# Patient Record
Sex: MALE | Employment: PART TIME | URBAN - METROPOLITAN AREA
[De-identification: names, ages, dates, MRNs, and addresses within clinical notes are randomized per-mention and may not be internally consistent; named-entity substitution may affect disease eponyms.]

---

## 2021-05-10 ENCOUNTER — TELEPHONE (OUTPATIENT)
Dept: ORTHOPEDICS | Facility: CLINIC | Age: 61
End: 2021-05-10

## 2021-05-10 DIAGNOSIS — M25.511 RIGHT SHOULDER PAIN, UNSPECIFIED CHRONICITY: Primary | ICD-10-CM

## 2021-05-12 ENCOUNTER — OFFICE VISIT (OUTPATIENT)
Dept: ORTHOPEDICS | Facility: CLINIC | Age: 61
End: 2021-05-12
Payer: MEDICAID

## 2021-05-12 VITALS
HEART RATE: 56 BPM | SYSTOLIC BLOOD PRESSURE: 116 MMHG | DIASTOLIC BLOOD PRESSURE: 76 MMHG | WEIGHT: 151 LBS | OXYGEN SATURATION: 98 % | BODY MASS INDEX: 28.51 KG/M2 | HEIGHT: 61 IN

## 2021-05-12 DIAGNOSIS — M25.511 CHRONIC RIGHT SHOULDER PAIN: Primary | ICD-10-CM

## 2021-05-12 DIAGNOSIS — G89.29 CHRONIC RIGHT SHOULDER PAIN: Primary | ICD-10-CM

## 2021-05-12 PROCEDURE — 99999 PR PBB SHADOW E&M-EST. PATIENT-LVL IV: CPT | Mod: PBBFAC,,, | Performed by: PHYSICIAN ASSISTANT

## 2021-05-12 PROCEDURE — 99203 OFFICE O/P NEW LOW 30 MIN: CPT | Mod: 25,S$PBB,, | Performed by: PHYSICIAN ASSISTANT

## 2021-05-12 PROCEDURE — 99214 OFFICE O/P EST MOD 30 MIN: CPT | Mod: PBBFAC,PN | Performed by: PHYSICIAN ASSISTANT

## 2021-05-12 PROCEDURE — 99203 PR OFFICE/OUTPT VISIT, NEW, LEVL III, 30-44 MIN: ICD-10-PCS | Mod: 25,S$PBB,, | Performed by: PHYSICIAN ASSISTANT

## 2021-05-12 PROCEDURE — 99999 PR PBB SHADOW E&M-EST. PATIENT-LVL IV: ICD-10-PCS | Mod: PBBFAC,,, | Performed by: PHYSICIAN ASSISTANT

## 2021-05-12 RX ORDER — TRIAMCINOLONE ACETONIDE 40 MG/ML
40 INJECTION, SUSPENSION INTRA-ARTICULAR; INTRAMUSCULAR
Status: DISCONTINUED | OUTPATIENT
Start: 2021-05-12 | End: 2021-05-12 | Stop reason: HOSPADM

## 2021-05-12 RX ORDER — LISINOPRIL AND HYDROCHLOROTHIAZIDE 12.5; 2 MG/1; MG/1
2 TABLET ORAL DAILY
COMMUNITY
Start: 2021-03-29

## 2021-05-12 RX ADMIN — TRIAMCINOLONE ACETONIDE 40 MG: 40 INJECTION, SUSPENSION INTRA-ARTICULAR; INTRAMUSCULAR at 08:05

## 2021-11-18 ENCOUNTER — TELEPHONE (OUTPATIENT)
Dept: ENDOSCOPY | Facility: HOSPITAL | Age: 61
End: 2021-11-18
Payer: MEDICAID

## 2021-11-18 DIAGNOSIS — Z12.11 COLON CANCER SCREENING: Primary | ICD-10-CM

## 2021-11-24 ENCOUNTER — TELEPHONE (OUTPATIENT)
Dept: ENDOSCOPY | Facility: HOSPITAL | Age: 61
End: 2021-11-24
Payer: MEDICAID

## 2021-11-24 DIAGNOSIS — Z12.11 COLON CANCER SCREENING: Primary | ICD-10-CM

## 2021-11-24 RX ORDER — SODIUM, POTASSIUM,MAG SULFATES 17.5-3.13G
1 SOLUTION, RECONSTITUTED, ORAL ORAL DAILY
Qty: 1 KIT | Refills: 0 | Status: SHIPPED | OUTPATIENT
Start: 2021-11-24 | End: 2021-11-26

## 2021-12-09 ENCOUNTER — ANESTHESIA EVENT (OUTPATIENT)
Dept: ENDOSCOPY | Facility: HOSPITAL | Age: 61
End: 2021-12-09
Payer: MEDICAID

## 2021-12-09 NOTE — ANESTHESIA PREPROCEDURE EVALUATION
12/09/2021  Mars Unger is a 61 y.o., male.History reviewed. No pertinent past medical history.  History reviewed. No pertinent surgical history.      Anesthesia Evaluation    I have reviewed the Patient Summary Reports.    I have reviewed the Nursing Notes. I have reviewed the NPO Status.   I have reviewed the Medications.     Review of Systems  Anesthesia Hx:  No previous Anesthesia  Denies Family Hx of Anesthesia complications.    Social:  Non-Smoker, No Alcohol Use    Hematology/Oncology:  Hematology Normal   Oncology Normal     EENT/Dental:EENT/Dental Normal   Cardiovascular:   Exercise tolerance: good Hypertension    Pulmonary:  Pulmonary Normal    Renal/:  Renal/ Normal     Hepatic/GI:   Bowel Prep.    Musculoskeletal:  Musculoskeletal Normal    Neurological:  Neurology Normal    Endocrine:  Endocrine Normal    Dermatological:  Skin Normal    Psych:  Psychiatric Normal           Physical Exam  General:  Well nourished    Airway/Jaw/Neck:  Airway Findings: Mouth Opening: Normal Tongue: Normal  General Airway Assessment: Adult  Mallampati: II  TM Distance: Normal, at least 6 cm  Jaw/Neck Findings:  Neck ROM: Normal ROM     Eyes/Ears/Nose:  EYES/EARS/NOSE FINDINGS: Normal   Dental:  Dental Findings: In tact   Chest/Lungs:  Chest/Lungs Clear    Heart/Vascular:  Heart Findings: Normal Heart murmur: negative Vascular Findings: Normal    Abdomen:  Abdomen Findings: Normal    Musculoskeletal:  Musculoskeletal Findings: Normal   Skin:  Skin Findings: Normal    Mental Status:  Mental Status Findings:  Cooperative, Alert and Oriented         Anesthesia Plan  Type of Anesthesia, risks & benefits discussed:  Anesthesia Type:  general    Patient's Preference:   Plan Factors:          Intra-op Monitoring Plan: standard ASA monitors  Intra-op Monitoring Plan Comments:   Post Op Pain Control Plan: per primary  service following discharge from PACU  Post Op Pain Control Plan Comments:     Induction:   IV  Beta Blocker:  Patient is not currently on a Beta-Blocker (No further documentation required).       Informed Consent: Patient understands risks and agrees with Anesthesia plan.  Questions answered. Anesthesia consent signed with patient.  ASA Score: 2     Day of Surgery Review of History & Physical:    H&P update referred to the provider.         Ready For Surgery From Anesthesia Perspective.

## 2021-12-13 ENCOUNTER — ANESTHESIA (OUTPATIENT)
Dept: ENDOSCOPY | Facility: HOSPITAL | Age: 61
End: 2021-12-13
Payer: MEDICAID

## 2022-01-20 ENCOUNTER — TELEPHONE (OUTPATIENT)
Dept: PREADMISSION TESTING | Facility: HOSPITAL | Age: 62
End: 2022-01-20
Payer: MEDICAID

## 2022-01-20 NOTE — PRE-PROCEDURE INSTRUCTIONS
PAT call completed.  Patient educated on procedure instructions.  Medical history discussed and patient informed of arrival time of 9:00 AM on Monday, January 24, 2022 at the [facility], and was made aware of the limited-visitor policy, and that  is to remain during the entire visit.  All questions and concerns addressed.  Endoscopy instructions reviewed. Instructed no solid food, only clear liquids, the day before the procedure, then at 6 PM, the day before the procedure, drink the first half of the bowel prep, then at 2 AM, the morning of procedure, drink the second half of the prep, then do not eat or drink anything until after the procedure.  Also instructed to take only his blood pressure medication, Lisinopril-HCTZ, the morning of procedure with just a few small sips of water.  Rapid pre-procedure covid testing to be performed the morning of procedure.  Patient verbalized understanding of all instructions.

## 2022-01-20 NOTE — TELEPHONE ENCOUNTER
PAT call completed.  Patient educated on procedure instructions.  Medical history discussed and patient informed of arrival time of 9:00 AM on Monday, January 24, 2022 at the [facility], and was made aware of the limited-visitor policy, and that  is to remain during the entire visit.  All questions and concerns addressed.  Endoscopy instructions reviewed. Instructed no solid food, only clear liquids, the day before the procedure, then at 6 PM, the day before the procedure, drink the first half of the bowel prep, then at 2 AM, the morning of procedure, drink the second half of the prep, then do not eat or drink anything until after the procedure.  Also instructed to take only his blood pressure medication, Lisinopril-HCTZ, the morning of procedure with just a few small sips of water.  Rapid pre-procedure covid testing to be performed the morning of procedure.  Patient verbalized understanding of all instructions.

## 2022-01-24 ENCOUNTER — HOSPITAL ENCOUNTER (OUTPATIENT)
Facility: HOSPITAL | Age: 62
Discharge: HOME | End: 2022-01-24
Attending: INTERNAL MEDICINE | Admitting: INTERNAL MEDICINE
Payer: MEDICAID

## 2022-01-24 VITALS
DIASTOLIC BLOOD PRESSURE: 74 MMHG | HEIGHT: 61 IN | SYSTOLIC BLOOD PRESSURE: 115 MMHG | WEIGHT: 146.63 LBS | TEMPERATURE: 98 F | BODY MASS INDEX: 27.68 KG/M2 | OXYGEN SATURATION: 98 % | RESPIRATION RATE: 16 BRPM | HEART RATE: 60 BPM

## 2022-01-24 DIAGNOSIS — Z12.11 ENCOUNTER FOR SCREENING COLONOSCOPY: Primary | ICD-10-CM

## 2022-01-24 LAB — SARS-COV-2 RNA NPH QL NAA+NON-PROBE: NOT DETECTED

## 2022-01-24 PROCEDURE — 37000008 HC ANESTHESIA 1ST 15 MINUTES: Performed by: INTERNAL MEDICINE

## 2022-01-24 PROCEDURE — 88305 TISSUE EXAM BY PATHOLOGIST: CPT | Mod: 26,,, | Performed by: PATHOLOGY

## 2022-01-24 PROCEDURE — D9220A PRA ANESTHESIA: Mod: CRNA,,, | Performed by: NURSE ANESTHETIST, CERTIFIED REGISTERED

## 2022-01-24 PROCEDURE — 45380 COLONOSCOPY AND BIOPSY: CPT | Mod: ,,, | Performed by: INTERNAL MEDICINE

## 2022-01-24 PROCEDURE — 88305 TISSUE EXAM BY PATHOLOGIST: ICD-10-PCS | Mod: 26,,, | Performed by: PATHOLOGY

## 2022-01-24 PROCEDURE — 00811 ANES LWR INTST NDSC NOS: CPT | Performed by: INTERNAL MEDICINE

## 2022-01-24 PROCEDURE — 45380 COLONOSCOPY AND BIOPSY: CPT | Mod: PT | Performed by: INTERNAL MEDICINE

## 2022-01-24 PROCEDURE — 37000009 HC ANESTHESIA EA ADD 15 MINS: Performed by: INTERNAL MEDICINE

## 2022-01-24 PROCEDURE — D9220A PRA ANESTHESIA: ICD-10-PCS | Mod: ANES,,, | Performed by: ANESTHESIOLOGY

## 2022-01-24 PROCEDURE — 88305 TISSUE EXAM BY PATHOLOGIST: CPT | Performed by: PATHOLOGY

## 2022-01-24 PROCEDURE — D9220A PRA ANESTHESIA: ICD-10-PCS | Mod: CRNA,,, | Performed by: NURSE ANESTHETIST, CERTIFIED REGISTERED

## 2022-01-24 PROCEDURE — 63600175 PHARM REV CODE 636 W HCPCS: Performed by: NURSE ANESTHETIST, CERTIFIED REGISTERED

## 2022-01-24 PROCEDURE — 27201012 HC FORCEPS, HOT/COLD, DISP: Performed by: INTERNAL MEDICINE

## 2022-01-24 PROCEDURE — 25000003 PHARM REV CODE 250: Performed by: NURSE ANESTHETIST, CERTIFIED REGISTERED

## 2022-01-24 PROCEDURE — 63600175 PHARM REV CODE 636 W HCPCS: Performed by: INTERNAL MEDICINE

## 2022-01-24 PROCEDURE — 45380 PR COLONOSCOPY,BIOPSY: ICD-10-PCS | Mod: ,,, | Performed by: INTERNAL MEDICINE

## 2022-01-24 PROCEDURE — D9220A PRA ANESTHESIA: Mod: ANES,,, | Performed by: ANESTHESIOLOGY

## 2022-01-24 RX ORDER — SODIUM CHLORIDE 0.9 % (FLUSH) 0.9 %
10 SYRINGE (ML) INJECTION
Status: DISCONTINUED | OUTPATIENT
Start: 2022-01-24 | End: 2022-01-24 | Stop reason: HOSPADM

## 2022-01-24 RX ORDER — PROPOFOL 10 MG/ML
VIAL (ML) INTRAVENOUS
Status: DISCONTINUED | OUTPATIENT
Start: 2022-01-24 | End: 2022-01-24

## 2022-01-24 RX ORDER — SODIUM CHLORIDE, SODIUM LACTATE, POTASSIUM CHLORIDE, CALCIUM CHLORIDE 600; 310; 30; 20 MG/100ML; MG/100ML; MG/100ML; MG/100ML
INJECTION, SOLUTION INTRAVENOUS CONTINUOUS
Status: ACTIVE | OUTPATIENT
Start: 2022-01-24

## 2022-01-24 RX ORDER — LIDOCAINE HYDROCHLORIDE 20 MG/ML
INJECTION, SOLUTION EPIDURAL; INFILTRATION; INTRACAUDAL; PERINEURAL
Status: DISCONTINUED | OUTPATIENT
Start: 2022-01-24 | End: 2022-01-24

## 2022-01-24 RX ORDER — SODIUM CHLORIDE, SODIUM LACTATE, POTASSIUM CHLORIDE, CALCIUM CHLORIDE 600; 310; 30; 20 MG/100ML; MG/100ML; MG/100ML; MG/100ML
INJECTION, SOLUTION INTRAVENOUS CONTINUOUS
Status: DISCONTINUED | OUTPATIENT
Start: 2022-01-24 | End: 2022-01-24 | Stop reason: HOSPADM

## 2022-01-24 RX ADMIN — PROPOFOL 40 MG: 10 INJECTION, EMULSION INTRAVENOUS at 09:01

## 2022-01-24 RX ADMIN — PROPOFOL 70 MG: 10 INJECTION, EMULSION INTRAVENOUS at 09:01

## 2022-01-24 RX ADMIN — PROPOFOL 20 MG: 10 INJECTION, EMULSION INTRAVENOUS at 09:01

## 2022-01-24 RX ADMIN — LIDOCAINE HYDROCHLORIDE 60 MG: 20 INJECTION, SOLUTION EPIDURAL; INFILTRATION; INTRACAUDAL; PERINEURAL at 09:01

## 2022-01-24 RX ADMIN — SODIUM CHLORIDE, SODIUM LACTATE, POTASSIUM CHLORIDE, AND CALCIUM CHLORIDE: 600; 310; 30; 20 INJECTION, SOLUTION INTRAVENOUS at 09:01

## 2022-01-24 RX ADMIN — PROPOFOL 30 MG: 10 INJECTION, EMULSION INTRAVENOUS at 09:01

## 2022-01-24 NOTE — ANESTHESIA POSTPROCEDURE EVALUATION
Anesthesia Post Evaluation    Patient: Mars Unger    Procedure(s) Performed: Procedure(s) (LRB):  COLONOSCOPY (N/A)    Final Anesthesia Type: general      Patient location during evaluation: PACU  Patient participation: Yes- Able to Participate  Level of consciousness: awake and alert and oriented  Post-procedure vital signs: reviewed and stable  Pain management: adequate  Airway patency: patent    PONV status at discharge: No PONV  Anesthetic complications: no      Cardiovascular status: blood pressure returned to baseline, stable and hemodynamically stable  Respiratory status: unassisted  Hydration status: euvolemic  Follow-up not needed.          Vitals Value Taken Time   /74 01/24/22 1024   Temp 36.7 °C (98 °F) 01/24/22 1024   Pulse 60 01/24/22 1024   Resp 16 01/24/22 1024   SpO2 98 % 01/24/22 1024         No case tracking events are documented in the log.      Pain/Dior Score: Dior Score: 10 (1/24/2022 10:24 AM)

## 2022-01-24 NOTE — DISCHARGE SUMMARY
[facility] - Endoscopy 1st Fl  Discharge Note  Short Stay    Procedure(s) (LRB):  COLONOSCOPY (N/A)    OUTCOME: Patient tolerated treatment/procedure well without complication and is now ready for discharge.    DISPOSITION: Home or Self Care    FINAL DIAGNOSIS:  Encounter for screening colonoscopy    FOLLOWUP: With primary care provider    DISCHARGE INSTRUCTIONS:  No discharge procedures on file.

## 2022-01-24 NOTE — TRANSFER OF CARE
"Anesthesia Transfer of Care Note    Patient: Mars Unger    Procedure(s) Performed: Procedure(s) (LRB):  COLONOSCOPY (N/A)    Patient location: PACU    Anesthesia Type: general    Transport from OR: Transported from OR on room air with adequate spontaneous ventilation    Post pain: adequate analgesia    Post assessment: no apparent anesthetic complications and tolerated procedure well    Post vital signs: stable    Level of consciousness: responds to stimulation and sedated    Nausea/Vomiting: no nausea/vomiting    Complications: none    Transfer of care protocol was followed      Last vitals:   Visit Vitals  /79 (BP Location: Right arm, Patient Position: Lying)   Pulse 78   Temp 36.5 °C (97.7 °F) (Temporal)   Resp 19   Ht 5' 1" (1.549 m)   Wt 66.5 kg (146 lb 9.7 oz)   SpO2 97%   BMI 27.70 kg/m²     "

## 2022-01-24 NOTE — H&P
Short Stay Endoscopy History and Physical    PCP - [facility] - Kevin    Procedure - Colonoscopy  ASA - 2  Mallampati - per anesthesia  History of Anesthesia problems - no  Family history Anesthesia problems -  no     HPI:  This is a 61 y.o. male here for evaluation of :   Active Hospital Problems    Diagnosis  POA    *Encounter for screening colonoscopy [Z12.11]  Not Applicable      Resolved Hospital Problems   No resolved problems to display.         Health Maintenance       Date Due Completion Date    Hepatitis C Screening Never done ---    Lipid Panel Never done ---    HIV Screening Never done ---    TETANUS VACCINE Never done ---    Colorectal Cancer Screening Never done ---    Shingles Vaccine (1 of 2) Never done ---    Influenza Vaccine (1) 09/01/2021 12/1/2020          ROS:  CONSTITUTIONAL: Denies weight change,  fatigue, fevers, chills, night sweats.  CARDIOVASCULAR: Denies chest pain, shortness of breath, orthopnea and edema.  RESPIRATORY: Denies cough, hemoptysis, dyspnea, and wheezing.  GI: See HPI.    Medical History:   History reviewed. No pertinent past medical history.    Surgical History:   History reviewed. No pertinent surgical history.    Family History:   History reviewed. No pertinent family history.    Social History:   Social History     Tobacco Use    Smoking status: Never Smoker    Smokeless tobacco: Never Used       Allergies:   Review of patient's allergies indicates:  No Known Allergies    Medications:   No current facility-administered medications on file prior to encounter.     Current Outpatient Medications on File Prior to Encounter   Medication Sig Dispense Refill    lisinopriL-hydrochlorothiazide (PRINZIDE,ZESTORETIC) 20-12.5 mg per tablet Take 2 tablets by mouth once daily.         Physical Exam:  Vital Signs: There were no vitals filed for this visit.  General Appearance: Well appearing in no acute distress  ENT: OP clear  Chest: CTA B  CV: RRR, no  m/r/g  Abd: s/nt/nd/nabs  Ext: no edema    Labs:Reviewed    IMP:  Active Hospital Problems    Diagnosis  POA    *Encounter for screening colonoscopy [Z12.11]  Not Applicable      Resolved Hospital Problems   No resolved problems to display.         Plan:   I have explained the risks and benefits of colonoscopy to the patient including but not limited to bleeding, perforation, infection, and death. The patient wishes to proceed.

## 2022-01-24 NOTE — [employer] PATIENT INSTRUCTIONS
Discharge Summary/Instructions after an Endoscopic Procedure  Patient Name: Mars Unger  Patient MRN: [id number]  Patient YOB: 1960 Monday, January 24, 2022  Coreen Alvarez MD  Dear patient,  As a result of recent federal legislation (The Federal Cures Act), you may   receive lab or pathology results from your procedure in your [facility]   account before your physician is able to contact you. Your physician or   their representative will relay the results to you with their   recommendations at their soonest availability.  Thank you,  RESTRICTIONS:  During your procedure today, you received medications for sedation.  These   medications may affect your judgment, balance and coordination.  Therefore,   for 24 hours, you have the following restrictions:   - DO NOT drive a car, operate machinery, make legal/financial decisions,   sign important papers or drink alcohol.    ACTIVITY:  Today: no heavy lifting, straining or running due to procedural   sedation/anesthesia.  The following day: return to full activity including work.  DIET:  Eat and drink normally unless instructed otherwise.     TREATMENT FOR COMMON SIDE EFFECTS:  - Mild abdominal pain, nausea, belching, bloating or excessive gas:  rest,   eat lightly and use a heating pad.  - Sore Throat: treat with throat lozenges and/or gargle with warm salt   water.  - Because air was used during the procedure, expelling large amounts of air   from your rectum or belching is normal.  - If a bowel prep was taken, you may not have a bowel movement for 1-3 days.    This is normal.  SYMPTOMS TO WATCH FOR AND REPORT TO YOUR PHYSICIAN:  1. Abdominal pain or bloating, other than gas cramps.  2. Chest pain.  3. Back pain.  4. Signs of infection such as: chills or fever occurring within 24 hours   after the procedure.  5. Rectal bleeding, which would show as bright red, maroon, or black stools.   (A tablespoon of blood from the rectum is not serious, especially  if   hemorrhoids are present.)  6. Vomiting.  7. Weakness or dizziness.  GO DIRECTLY TO THE NEAREST EMERGENCY ROOM IF YOU HAVE ANY OF THE FOLLOWING:      Difficulty breathing              Chills and/or fever over 101 F   Persistent vomiting and/or vomiting blood   Severe abdominal pain   Severe chest pain   Black, tarry stools   Bleeding- more than one tablespoon   Any other symptom or condition that you feel may need urgent attention  Your doctor recommends these additional instructions:  If any biopsies were taken, your doctors clinic will contact you in 1 to 2   weeks with any results.  - Discharge patient to home (via wheelchair).   - Resume previous diet.   - Continue present medications.   - Await pathology results.   - Repeat colonoscopy in 7 years for surveillance.   - Telephone GI clinic for pathology results in 2 weeks.   - Patient has a contact number available for emergencies.  The signs and   symptoms of potential delayed complications were discussed with the   patient.  Return to normal activities tomorrow.  Written discharge   instructions were provided to the patient.  For questions, problems or results please call your physician Coreen Alvarez MD at Work:  [phone]  If you have any questions about the above instructions, call the GI   department at [phone] or call the endoscopy unit at [phone]   from 7am until 3 pm.  [facility] [place], EMERGENCY ROOM PHONE NUMBER:   [phone]  IF A COMPLICATION OR EMERGENCY SITUATION ARISES AND YOU ARE UNABLE TO REACH   YOUR PHYSICIAN - GO DIRECTLY TO THE EMERGENCY ROOM.  I have read or have had read to me these discharge instructions for my   procedure and have received a written copy.  I understand these   instructions and will follow-up with my physician if I have any questions.     __________________________________       _____________________________________  Nurse Signature                                           Patient/Designated   Responsible Party Signature  MD Coreen Arana MD  1/24/2022 10:03:34 AM  [employer]

## 2022-02-03 LAB
FINAL PATHOLOGIC DIAGNOSIS: NORMAL
GROSS: NORMAL
Lab: NORMAL

## 2022-11-17 ENCOUNTER — TELEPHONE (OUTPATIENT)
Dept: UROLOGY | Facility: CLINIC | Age: 62
End: 2022-11-17
Payer: MEDICAID

## 2022-11-17 NOTE — TELEPHONE ENCOUNTER
Tried calling pt there was no answer. We do not have access to scheduled new pt's at this time.       ----- Message from Elba Gomez sent at 11/17/2022  3:17 PM CST -----  Dr. Sean Anna would like to refer the patient to the Urology department at [facility]. The patients diagnosis is Elevated PSA.     I have scanned the patients referral and records into .     Please review and contact patient to schedule appointment    Thank you,   Elba Schmitz

## 2024-04-04 DIAGNOSIS — Z76.89 ENCOUNTER TO ESTABLISH CARE: Primary | ICD-10-CM

## 2024-12-09 ENCOUNTER — HOSPITAL ENCOUNTER (OUTPATIENT)
Dept: RADIOLOGY | Facility: HOSPITAL | Age: 64
Discharge: HOME | End: 2024-12-09
Attending: FAMILY MEDICINE
Payer: MEDICAID

## 2024-12-09 ENCOUNTER — OFFICE VISIT (OUTPATIENT)
Dept: PRIMARY CARE CLINIC | Facility: CLINIC | Age: 64
End: 2024-12-09
Payer: MEDICAID

## 2024-12-09 VITALS
HEART RATE: 64 BPM | BODY MASS INDEX: 28.55 KG/M2 | TEMPERATURE: 98 F | OXYGEN SATURATION: 98 % | SYSTOLIC BLOOD PRESSURE: 112 MMHG | WEIGHT: 151.19 LBS | HEIGHT: 61 IN | DIASTOLIC BLOOD PRESSURE: 82 MMHG

## 2024-12-09 DIAGNOSIS — M70.61 GREATER TROCHANTERIC BURSITIS, RIGHT: ICD-10-CM

## 2024-12-09 DIAGNOSIS — Z79.899 ENCOUNTER FOR LONG-TERM CURRENT USE OF MEDICATION: ICD-10-CM

## 2024-12-09 DIAGNOSIS — Z11.3 SCREEN FOR STD (SEXUALLY TRANSMITTED DISEASE): ICD-10-CM

## 2024-12-09 DIAGNOSIS — Z00.01 ENCOUNTER FOR GENERAL ADULT MEDICAL EXAMINATION WITH ABNORMAL FINDINGS: Primary | ICD-10-CM

## 2024-12-09 DIAGNOSIS — Z23 FLU VACCINE NEED: ICD-10-CM

## 2024-12-09 DIAGNOSIS — I10 ESSENTIAL HYPERTENSION: ICD-10-CM

## 2024-12-09 DIAGNOSIS — Z12.5 PROSTATE CANCER SCREENING: ICD-10-CM

## 2024-12-09 PROCEDURE — 99213 OFFICE O/P EST LOW 20 MIN: CPT | Mod: S$PBB,25,, | Performed by: FAMILY MEDICINE

## 2024-12-09 PROCEDURE — 99386 PREV VISIT NEW AGE 40-64: CPT | Mod: S$PBB,,, | Performed by: FAMILY MEDICINE

## 2024-12-09 PROCEDURE — 73502 X-RAY EXAM HIP UNI 2-3 VIEWS: CPT | Mod: TC,PN,RT

## 2024-12-09 PROCEDURE — 99214 OFFICE O/P EST MOD 30 MIN: CPT | Mod: PBBFAC,25,PN | Performed by: FAMILY MEDICINE

## 2024-12-09 PROCEDURE — 4010F ACE/ARB THERAPY RXD/TAKEN: CPT | Mod: CPTII,,, | Performed by: FAMILY MEDICINE

## 2024-12-09 PROCEDURE — 73502 X-RAY EXAM HIP UNI 2-3 VIEWS: CPT | Mod: 26,RT,, | Performed by: RADIOLOGY

## 2024-12-09 PROCEDURE — 99999 PR PBB SHADOW E&M-EST. PATIENT-LVL IV: CPT | Mod: PBBFAC,,, | Performed by: FAMILY MEDICINE

## 2024-12-09 PROCEDURE — 99999PBSHW PR PBB SHADOW TECHNICAL ONLY FILED TO HB: Mod: PBBFAC,,,

## 2024-12-09 PROCEDURE — 3074F SYST BP LT 130 MM HG: CPT | Mod: CPTII,,, | Performed by: FAMILY MEDICINE

## 2024-12-09 PROCEDURE — 3008F BODY MASS INDEX DOCD: CPT | Mod: CPTII,,, | Performed by: FAMILY MEDICINE

## 2024-12-09 PROCEDURE — 1159F MED LIST DOCD IN RCRD: CPT | Mod: CPTII,,, | Performed by: FAMILY MEDICINE

## 2024-12-09 PROCEDURE — 90471 IMMUNIZATION ADMIN: CPT | Mod: PBBFAC,PN

## 2024-12-09 PROCEDURE — 3079F DIAST BP 80-89 MM HG: CPT | Mod: CPTII,,, | Performed by: FAMILY MEDICINE

## 2024-12-09 PROCEDURE — 90656 IIV3 VACC NO PRSV 0.5 ML IM: CPT | Mod: PBBFAC,PN

## 2024-12-09 RX ORDER — ATORVASTATIN CALCIUM 10 MG/1
10 TABLET, FILM COATED ORAL NIGHTLY
COMMUNITY
Start: 2024-09-20

## 2024-12-09 RX ORDER — ERGOCALCIFEROL 1.25 MG/1
50000 CAPSULE ORAL
COMMUNITY
Start: 2024-10-14

## 2024-12-09 RX ORDER — LIDOCAINE 50 MG/G
1 PATCH TOPICAL DAILY
Qty: 30 PATCH | Refills: 0 | Status: SHIPPED | OUTPATIENT
Start: 2024-12-09 | End: 2025-01-08

## 2024-12-09 RX ORDER — NYSTATIN AND TRIAMCINOLONE ACETONIDE 100000; 1 [USP'U]/G; MG/G
CREAM TOPICAL 2 TIMES DAILY
COMMUNITY
Start: 2024-07-10

## 2024-12-09 RX ORDER — OMEPRAZOLE 40 MG/1
40 CAPSULE, DELAYED RELEASE ORAL
COMMUNITY
Start: 2024-09-20

## 2024-12-09 RX ADMIN — INFLUENZA VIRUS VACCINE 0.5 ML: 15; 15; 15 SUSPENSION INTRAMUSCULAR at 09:12

## 2024-12-09 NOTE — PROGRESS NOTES
"Subjective:      Chief Complaint   Patient presents with    [facility]     Med refills/ check diabetes and prostate levels elevated      Patient ID: Mars Unger is a 64 y.o. male.  History of Present Illness    CHIEF COMPLAINT:  Mars presents today for follow-up of hypertension and possible pre-diabetes.    PRE-DIABETES:  He was diagnosed with pre-diabetes approximately two months ago. He was prescribed metformin but is not taking it. He performs home glucose monitoring, with fasting levels typically below 126 mg/dL.    HYPERTENSION:  <UNKNOWN>    PROSTATE HEALTH:  He reports an elevated PSA level, estimated to be around 5, which is above the normal range of below 4. He was referred to a urologist for follow-up but did not happen. He acknowledges the need for further evaluation and monitoring of his prostate health.    MUSCULOSKELETAL:  He reports right hip pain localized to the trochanteric bursa region. The pain began after attempting to adduct his leg while exiting a hammock. The pain is severe enough to disrupt sleep sometimes. He denies radiating pain down the leg or groin pain characteristic of sciatica or hip joint issues.    OPHTHALMOLOGY:  He reports experiencing dry eyes. He recently completed a course of medication for this issue and is due for follow-up with his ophthalmologist. His eye appears red upon exam.    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 He describes his current work as "slow," only going to work when a job is ready for him to run. He denies regular exercise.    FAMILY HISTORY:  He reports having three daughters.      ROS:  General: -fever, -chills, -fatigue, -weight gain, -weight loss  Eyes: -vision changes, -redness, -discharge, -eye pain  ENT: -ear pain, -nasal congestion, -sore throat  Cardiovascular: -chest pain, -palpitations, -lower extremity edema  Respiratory: -cough, " -shortness of breath  Gastrointestinal: -abdominal pain, -nausea, -vomiting, -diarrhea, -constipation, -blood in stool  Genitourinary: -dysuria, -hematuria, -frequency  Musculoskeletal: +joint pain, -muscle pain  Skin: -rash, -lesion  Neurological: -headache, -dizziness, -numbness, -tingling  Psychiatric: -anxiety, -depression, -sleep difficulty       Mars Unger's allergies, medications, history, and problem list were updated as appropriate.  Past Medical History:   Diagnosis Date    Hyperlipidemia     Hypertension      No family history on file.  Social History     Socioeconomic History    Marital status:    Tobacco Use    Smoking status: Never     Passive exposure: Never    Smokeless tobacco: Never   Substance and Sexual Activity    Alcohol use: Not Currently    Drug use: Never    Sexual activity: Yes     Social Drivers of Health     Financial Resource Strain: Low Risk  (12/8/2024)    Overall Financial Resource Strain (CARDIA)     Difficulty of Paying Living Expenses: Not very hard   Food Insecurity: No Food Insecurity (12/8/2024)    Hunger Vital Sign     Worried About Running Out of Food in the Last Year: Never true     Ran Out of Food in the Last Year: Never true   Physical Activity: Inactive (12/8/2024)    Exercise Vital Sign     Days of Exercise per Week: 0 days     Minutes of Exercise per Session: 0 min   Stress: No Stress Concern Present (12/8/2024)    [employer] - Occupational Stress Questionnaire     Feeling of Stress : Only a little   Housing Stability: Unknown (12/8/2024)    Housing Stability Vital Sign     Unable to Pay for Housing in the Last Year: No     Outpatient Encounter Medications as of 12/9/2024   Medication Sig Dispense Refill    atorvastatin (LIPITOR) 10 MG tablet Take 10 mg by mouth every evening.      ergocalciferol (ERGOCALCIFEROL) 50,000 unit Cap Take 50,000 Units by mouth every 7 days.      lisinopriL-hydrochlorothiazide (PRINZIDE,ZESTORETIC)  "20-12.5 mg per tablet Take 2 tablets by mouth once daily.      nystatin-triamcinolone (MYCOLOG II) cream Apply topically 2 (two) times daily.      omeprazole (PRILOSEC) 40 MG capsule Take 40 mg by mouth.      LIDOcaine (LIDODERM) 5 % Place 1 patch onto the skin once daily. Remove & Discard patch within 12 hours or as directed by MD 30 patch 0     Facility-Administered Encounter Medications as of 12/9/2024   Medication Dose Route Frequency Provider Last Rate Last Admin    [COMPLETED] influenza (Flulaval, Fluzone, Fluarix) 45 mcg/0.5 mL IM vaccine (> or = 6 mo) 0.5 mL  0.5 mL Intramuscular 1 time in Clinic/HOD    0.5 mL at 12/09/24 0910    lactated ringers infusion   Intravenous Continuous Coreen Alvarez MD   New Bag at 01/24/22 0942          Objective:      /82   Pulse 64   Temp 97.7 °F (36.5 °C)   Ht 5' 1" (1.549 m)   Wt 68.6 kg (151 lb 3.2 oz)   SpO2 98%   BMI 28.57 kg/m²   Physical Exam  Vitals and nursing note reviewed.   Constitutional:       General: He is not in acute distress.  HENT:      Head: Normocephalic and atraumatic.   Cardiovascular:      Rate and Rhythm: Normal rate and regular rhythm.      Pulses: Normal pulses.      Heart sounds: No murmur heard.  Pulmonary:      Effort: Pulmonary effort is normal. No respiratory distress.      Breath sounds: Normal breath sounds. No wheezing or rhonchi.   Musculoskeletal:         General: No swelling or deformity.      Right hip: Tenderness (right greater trochanter cw bursitis) present.      Right lower leg: No edema.      Left lower leg: No edema.        Legs:       Comments: AN and FADIR are positive to the right hip.   Neurological:      General: No focal deficit present.      Mental Status: He is alert.      Cranial Nerves: No cranial nerve deficit.   Psychiatric:         Behavior: Behavior normal.         Thought Content: Thought content normal.            Physical Exam    Eyes: Right eye appears red.  MSK: Spine - Hip: Pain in right " "trochanteric bursa area.        Results for orders placed or performed during the hospital encounter of 01/24/22   COVID-19 Rapid Screening    Collection Time: 01/24/22  9:00 AM   Result Value Ref Range    SARS-COV-2 Not Detected Not Detected   Specimen to Pathology, Surgery Gastrointestinal tract    Collection Time: 01/24/22 10:02 AM   Result Value Ref Range    Final Pathologic Diagnosis       Colon, cecum, polypectomy:  - Colonic mucosa with surface hyperplastic change.      Gross       Patient ID:  [id number] Pathology ID:  [id number]\  Received in 1 part  Part 1  Received in formalin labeled "cecal polypectomy" are soft white-gray  fragments of tissue measuring 2 x 2 mm in aggregate.  Specimen may not  survive processing.  Dyed with hematoxylin and submitted entirely in  [id number]  Grossed by Jorge Daniels      Disclaimer       Unless the case is a 'gross only' or additional testing only, the final  diagnosis for each specimen is based on a microscopic examination of  appropriate tissue sections.       Assessment/Plan:         1. Encounter for general adult medical examination with abnormal findings    2. Flu vaccine need    3. Screen for STD (sexually transmitted disease)    4. Essential hypertension    5. Encounter for long-term current use of medication    6. Prostate cancer screening    7. Greater trochanteric bursitis, right      Encounter for general adult medical examination with abnormal findings  Comments:  right greater trochanteric bursitis  Orders:  -     CBC Auto Differential; Future; Expected date: 12/09/2024  -     Comprehensive Metabolic Panel; Future; Expected date: 12/09/2024  -     Hemoglobin A1C; Future; Expected date: 12/09/2024  -     Lipid Panel; Future; Expected date: 12/09/2024    Flu vaccine need  -     influenza (Flulaval, Fluzone, Fluarix) 45 mcg/0.5 mL IM vaccine (> or = 6 mo) 0.5 mL    Screen for STD (sexually transmitted disease)  -     Treponema Pallidium Antibodies IgG, " IgM; Future; Expected date: 12/09/2024  -     Hepatitis C Antibody; Future; Expected date: 12/09/2024  -     HIV 1/2 Ag/Ab (4th Gen); Future; Expected date: 12/09/2024  -     C. trachomatis/N. gonorrhoeae by AMP DNA; Future; Expected date: 12/09/2024  -     Hepatitis B Surface Antigen; Future; Expected date: 12/09/2024    Essential hypertension  -     Lipid Panel; Future; Expected date: 12/09/2024  -     Microalbumin/Creatinine Ratio, Urine; Future; Expected date: 12/09/2024    Encounter for long-term current use of medication  -     TSH; Future; Expected date: 12/09/2024    Prostate cancer screening  -     PSA, SCREENING; Future; Expected date: 12/09/2024    Greater trochanteric bursitis, right  -     X-Ray Hip 2 or 3 views Right with Pelvis when performed; Future; Expected date: 12/09/2024  -     LIDOcaine (LIDODERM) 5 %; Place 1 patch onto the skin once daily. Remove & Discard patch within 12 hours or as directed by MD  Dispense: 30 patch; Refill: 0      PRE-DIABETES / ABNORMAL GLUCOSE:  - Assessed patient's pre-diabetic status based on home glucose readings of 115.  - Explained normal glucose ranges: 70-99 mg/dL normal, 110-125 mg/dL borderline, >126 mg/dL diabetic.  - Mars to walk 5 times per week, especially after meals, to help manage blood sugar.  - Mars to watch carbohydrate intake and avoid soda to manage pre-diabetes.  - Ordered labs to check kidney function and blood sugar.    TROCHANTERIC BURSITIS (HIP PAIN):  - Evaluated hip pain as likely trochanteric bursitis based on patient's description and physical exam.  - Considered steroid injection for hip pain pending x-ray results and lab work.  - Educated on the location and function of the trochanteric bursa in relation to hip movement and pain.  - Described difference between hip pain and sciatica, demonstrating locations on patient's body.  - Started lidocaine patch for hip pain, to be applied over the affected area.  - Continued Tylenol Arthritis  for hip pain.  - Ordered x-ray of hip to rule out other conditions before considering steroid injection.    ELEVATED PSA:  - Reviewed PSA levels from previous visit, noting slight elevation.  - Ordered labs to check PSA levels.  - Recommend follow-up with urologist for elevated PSA.    DRY EYE SYNDROME:  - Mars to inform eye doctor of new primary care physician relationship.    FOLLOW-UP:  - Follow up after x-ray and lab results are available to discuss potential steroid injection for hip pain.            I have reviewed all of the patient's clinical history available in care everywhere and Epic and have utilized this in my evaluation and management recommendations today.      Treatment options and alternatives were discussed with the patient. Patient was given ample time to ask questions. All questions were answered. Voices understanding and acceptance of this advice. Will call back if any further questions or concerns.       Portions of the record may have been created with voice recognition software. Occasional wrong-word or sound-a-like substitutions may have occurred due to the inherent limitations of voice recognition software. Read the chart carefully and recognize, using context, where substitutions have occurred.      This note was generated with the assistance of ambient listening technology. Verbal consent was obtained by the patient and accompanying visitor(s) for the recording of patient appointment to facilitate this note. I attest to having reviewed and edited the generated note for accuracy, though some syntax or spelling errors may persist. Please contact the author of this note for any clarification.            Valeria Vela MD  [facility],

## 2024-12-10 ENCOUNTER — TELEPHONE (OUTPATIENT)
Dept: PRIMARY CARE CLINIC | Facility: CLINIC | Age: 64
End: 2024-12-10
Payer: MEDICAID

## 2024-12-10 NOTE — PROGRESS NOTES
Your xray is normal and you pain is cw greater trochanteric bursitis. Return to clinic if pain persists for injection as discussed.

## 2024-12-10 NOTE — TELEPHONE ENCOUNTER
I have attempted without success to contact this patient by phone to discuss lab results.     ----- Message from Valeria Vela MD sent at 12/10/2024 10:22 AM CST -----  Your xray is normal and you pain is cw greater trochanteric bursitis. Return to clinic if pain persists for injection as discussed.

## 2024-12-15 DIAGNOSIS — R74.01 ELEVATED ALT MEASUREMENT: Primary | ICD-10-CM

## 2024-12-16 ENCOUNTER — TELEPHONE (OUTPATIENT)
Dept: PRIMARY CARE CLINIC | Facility: CLINIC | Age: 64
End: 2024-12-16
Payer: MEDICAID

## 2024-12-16 NOTE — TELEPHONE ENCOUNTER
Called to inform pt of labs, no answer. Lvm     ----- Message from Valeria Vela MD sent at 12/15/2024  6:56 PM CST -----  Please contact the patient and let them know that their results were fine except that you have prediabetes and one of your liver enzyme is elevated.  Hold atorvastatin for one month. Repeat CMP in one month. Avoid alcohol and excessive tylenol.    The 10-year ASCVD risk score (Sandra DK, et al., 2019) is: 9.4%    Values used to calculate the score:      Age: 64 years      Sex: Male      Is Non- : No      Diabetic: No      Tobacco smoker: No      Systolic Blood Pressure: 112 mmHg      Is BP treated: Yes      HDL Cholesterol: 43 mg/dL      Total Cholesterol: 160 mg/dL

## 2025-01-24 ENCOUNTER — TELEPHONE (OUTPATIENT)
Dept: PRIMARY CARE CLINIC | Facility: CLINIC | Age: 65
End: 2025-01-24
Payer: MEDICAID

## 2025-01-24 NOTE — TELEPHONE ENCOUNTER
Returned call top pt in regards to appointment. Pt advised to follow up with scheduled appointment. Pt appointment is scheduled for 01/27/2025 at 3:00 pm.  Pt voiced understanding.     ----- Message from Carmelita sent at 1/24/2025  2:30 PM CST -----  Contact: Pt [phone]  [email or web address]     Patient is calling for Medical Advice regarding: Pt is calling stating he is still having pain in his hips. So he is calling off orders from pcp to call to make an appt to get the shot for it. Pcp next appt was in march, so he schedule appt with NP for 01/27/25. Wanted to let pcp know. Also wanted to see if he can see pcpc instead but if not is it okay for seeing NP for the reason and still getting the shot.    Patient wants a call back or thru [facility]: call back    Comments:    Please advise patient replies from provider may take up to 48 hours.        Please Call and advise    Thank you    Please do NOT rep[ly to sender as this is from the call center and they answer incoming calls only.

## 2025-01-27 ENCOUNTER — LAB VISIT (OUTPATIENT)
Dept: LAB | Facility: HOSPITAL | Age: 65
End: 2025-01-27
Attending: NURSE PRACTITIONER
Payer: MEDICAID

## 2025-01-27 ENCOUNTER — OFFICE VISIT (OUTPATIENT)
Dept: PRIMARY CARE CLINIC | Facility: CLINIC | Age: 65
End: 2025-01-27
Payer: MEDICAID

## 2025-01-27 VITALS
HEART RATE: 64 BPM | SYSTOLIC BLOOD PRESSURE: 130 MMHG | WEIGHT: 152.81 LBS | BODY MASS INDEX: 28.85 KG/M2 | OXYGEN SATURATION: 98 % | TEMPERATURE: 98 F | DIASTOLIC BLOOD PRESSURE: 82 MMHG | HEIGHT: 61 IN

## 2025-01-27 DIAGNOSIS — M70.61 GREATER TROCHANTERIC BURSITIS, RIGHT: Primary | ICD-10-CM

## 2025-01-27 DIAGNOSIS — I10 ESSENTIAL HYPERTENSION: ICD-10-CM

## 2025-01-27 PROCEDURE — 99213 OFFICE O/P EST LOW 20 MIN: CPT | Mod: PBBFAC,PN | Performed by: NURSE PRACTITIONER

## 2025-01-27 PROCEDURE — 99214 OFFICE O/P EST MOD 30 MIN: CPT | Mod: S$PBB,,, | Performed by: NURSE PRACTITIONER

## 2025-01-27 PROCEDURE — 99999PBSHW PR PBB SHADOW TECHNICAL ONLY FILED TO HB: Mod: PBBFAC,,,

## 2025-01-27 PROCEDURE — 96372 THER/PROPH/DIAG INJ SC/IM: CPT | Mod: PBBFAC,PN

## 2025-01-27 PROCEDURE — 36415 COLL VENOUS BLD VENIPUNCTURE: CPT | Mod: PN | Performed by: NURSE PRACTITIONER

## 2025-01-27 PROCEDURE — 80053 COMPREHEN METABOLIC PANEL: CPT | Performed by: NURSE PRACTITIONER

## 2025-01-27 PROCEDURE — 3008F BODY MASS INDEX DOCD: CPT | Mod: CPTII,,, | Performed by: NURSE PRACTITIONER

## 2025-01-27 PROCEDURE — 1160F RVW MEDS BY RX/DR IN RCRD: CPT | Mod: CPTII,,, | Performed by: NURSE PRACTITIONER

## 2025-01-27 PROCEDURE — 1159F MED LIST DOCD IN RCRD: CPT | Mod: CPTII,,, | Performed by: NURSE PRACTITIONER

## 2025-01-27 PROCEDURE — 99999 PR PBB SHADOW E&M-EST. PATIENT-LVL III: CPT | Mod: PBBFAC,,, | Performed by: NURSE PRACTITIONER

## 2025-01-27 PROCEDURE — 3075F SYST BP GE 130 - 139MM HG: CPT | Mod: CPTII,,, | Performed by: NURSE PRACTITIONER

## 2025-01-27 PROCEDURE — 3079F DIAST BP 80-89 MM HG: CPT | Mod: CPTII,,, | Performed by: NURSE PRACTITIONER

## 2025-01-27 RX ORDER — NYSTATIN AND TRIAMCINOLONE ACETONIDE 100000; 1 [USP'U]/G; MG/G
CREAM TOPICAL 2 TIMES DAILY
Qty: 30 G | Refills: 0 | Status: SHIPPED | OUTPATIENT
Start: 2025-01-27 | End: 2025-02-10

## 2025-01-27 RX ORDER — BETAMETHASONE SODIUM PHOSPHATE AND BETAMETHASONE ACETATE 3; 3 MG/ML; MG/ML
6 INJECTION, SUSPENSION INTRA-ARTICULAR; INTRALESIONAL; INTRAMUSCULAR; SOFT TISSUE
Status: COMPLETED | OUTPATIENT
Start: 2025-01-27 | End: 2025-01-27

## 2025-01-27 RX ADMIN — BETAMETHASONE ACETATE AND BETAMETHASONE SODIUM PHOSPHATE 6 MG: 3; 3 INJECTION, SUSPENSION INTRA-ARTICULAR; INTRALESIONAL; INTRAMUSCULAR; SOFT TISSUE at 04:01

## 2025-01-28 LAB
ALBUMIN SERPL BCP-MCNC: 4.4 G/DL (ref 3.5–5.2)
ALP SERPL-CCNC: 91 U/L (ref 40–150)
ALT SERPL W/O P-5'-P-CCNC: 69 U/L (ref 10–44)
ANION GAP SERPL CALC-SCNC: 10 MMOL/L (ref 8–16)
AST SERPL-CCNC: 36 U/L (ref 10–40)
BILIRUB SERPL-MCNC: 0.3 MG/DL (ref 0.1–1)
BUN SERPL-MCNC: 12 MG/DL (ref 8–23)
CALCIUM SERPL-MCNC: 10 MG/DL (ref 8.7–10.5)
CHLORIDE SERPL-SCNC: 104 MMOL/L (ref 95–110)
CO2 SERPL-SCNC: 28 MMOL/L (ref 23–29)
CREAT SERPL-MCNC: 0.9 MG/DL (ref 0.5–1.4)
EST. GFR  (NO RACE VARIABLE): >60 ML/MIN/1.73 M^2
GLUCOSE SERPL-MCNC: 68 MG/DL (ref 70–110)
POTASSIUM SERPL-SCNC: 4.2 MMOL/L (ref 3.5–5.1)
PROT SERPL-MCNC: 8 G/DL (ref 6–8.4)
SODIUM SERPL-SCNC: 142 MMOL/L (ref 136–145)

## 2025-01-30 NOTE — PROGRESS NOTES
Subjective:       Patient ID: Mars Unger is a 64 y.o. male.    Chief Complaint: Hip Pain        Mars Unger 64 y.o. male   History of Present Illness    CHIEF COMPLAINT:  - Mars presents with right hip pain that has been ongoing for a few months.    HPI:  - Mars reports right hip pain for a few months. The pain is localized to the right side, radiates down the leg, and is in the joint area between the bones. It is severe and sharp, with a sensation of inflammation in the affected area. The pain is particularly intense at night, causing frequent awakening, and is exacerbated by movement, especially when attempting to move the leg while lying down. It is most pronounced in the morning after periods of inactivity, causing stiffness.  - Mars has attempted self-management techniques at home, including certain movements and self-massage, which sometimes provide temporary relief. He can walk without significant issues, but the pain persists and is bothersome.  - During a previous visit, the patient discussed this issue with Dr. Shaw, who suggested the possibility of a steroid injection for treatment. No treatment was administered at that time, and no x-rays were performed. Mars reports a history of hip pain but states that this current episode is different and more severe.    MEDICATIONS:  - Vitamin D, taken for a few years  - Atorvastatin (Lipitor), for heart/cholesterol    MEDICAL HISTORY:  - Bursitis: Hip  - Pre-diabetes  - Elevated liver enzymes  - Hyperlipidemia    TEST RESULTS:  - Vitamin D: 2 years ago  - Pre-diabetes test: Recently, HbA1c > 5.7 but < 6.4  - Liver enzyme test: Recently, slightly elevated  - Lipid panel: Recently, elevated (led to atorvastatin prescription)    IMAGING:  - Hip X-ray: Recently, results showed bursitis in the hip    SOCIAL HISTORY:  - Coffee: Drinks 2 large cups in the morning      ROS:  General: -fever, -chills, -fatigue, -weight gain, -weight loss  Eyes: -vision  changes, -redness, -discharge  ENT: -ear pain, -nasal congestion, -sore throat  Cardiovascular: -chest pain, -palpitations, -lower extremity edema  Respiratory: -cough, -shortness of breath  Gastrointestinal: -abdominal pain, -nausea, -vomiting, -diarrhea, -constipation, -blood in stool  Genitourinary: -dysuria, -hematuria, -frequency  Musculoskeletal: +joint pain, -muscle pain  Skin: -rash, -lesion  Neurological: -headache, -dizziness, -numbness, -tingling  Psychiatric: -anxiety, -depression, -sleep difficulty        Past Medical History:   Diagnosis Date    Hyperlipidemia     Hypertension      No family history on file.  Social History     Socioeconomic History    Marital status:    Tobacco Use    Smoking status: Never     Passive exposure: Never    Smokeless tobacco: Never   Substance and Sexual Activity    Alcohol use: Not Currently    Drug use: Never    Sexual activity: Yes     Social Drivers of Health     Financial Resource Strain: Low Risk  (12/8/2024)    Overall Financial Resource Strain (CARDIA)     Difficulty of Paying Living Expenses: Not very hard   Food Insecurity: No Food Insecurity (12/8/2024)    Hunger Vital Sign     Worried About Running Out of Food in the Last Year: Never true     Ran Out of Food in the Last Year: Never true   Physical Activity: Inactive (12/8/2024)    Exercise Vital Sign     Days of Exercise per Week: 0 days     Minutes of Exercise per Session: 0 min   Stress: No Stress Concern Present (12/8/2024)    [employer] - Occupational Stress Questionnaire     Feeling of Stress : Only a little   Housing Stability: Unknown (12/8/2024)    Housing Stability Vital Sign     Unable to Pay for Housing in the Last Year: No     Outpatient Encounter Medications as of 1/27/2025   Medication Sig Dispense Refill    atorvastatin (LIPITOR) 10 MG tablet Take 10 mg by mouth every evening.      lisinopriL-hydrochlorothiazide (PRINZIDE,ZESTORETIC) 20-12.5 mg per tablet  "Take 2 tablets by mouth once daily.      omeprazole (PRILOSEC) 40 MG capsule Take 40 mg by mouth.      [DISCONTINUED] nystatin-triamcinolone (MYCOLOG II) cream Apply topically 2 (two) times daily.      ergocalciferol (ERGOCALCIFEROL) 50,000 unit Cap Take 50,000 Units by mouth every 7 days. (Patient not taking: Reported on 1/27/2025)      nystatin-triamcinolone (MYCOLOG II) cream Apply topically 2 (two) times daily. for 14 days 30 g 0     Facility-Administered Encounter Medications as of 1/27/2025   Medication Dose Route Frequency Provider Last Rate Last Admin    [COMPLETED] betamethasone acetate-betamethasone sodium phosphate injection 6 mg  6 mg Intramuscular 1 time in Clinic/HOD    6 mg at 01/27/25 1608    lactated ringers infusion   Intravenous Continuous Coreen Alvarez MD   New Bag at 01/24/22 0942           Objective:      /82   Pulse 64   Temp 98.4 °F (36.9 °C) (Oral)   Ht 5' 1" (1.549 m)   Wt 69.3 kg (152 lb 12.8 oz)   SpO2 98%   BMI 28.87 kg/m²   Physical Exam    General: In no acute distress.  Head: Normocephalic. Non traumatic.  Eyes: PERRLA. EOMs full. Conjunctivae clear. Fundi grossly normal.  Ears: EACs clear. TMs normal.  Nose: Mucosa pink. Mucosa moist. No obstruction.  Throat: Clear. No exudates. No lesions.  Neck: Supple. No masses. No thyromegaly. No bruits.  Chest: Lungs clear. No rales. No rhonchi. No wheezes.  Heart: RRR. No murmurs. No rubs. No gallops.  Abdomen: Soft. No tenderness. No masses. BS normal.  : Normal external genitalia. No lesions. No discharge. No hernias  noted.  Back: Normal curvature. No scoliosis. No tenderness.  Extremities: Warm. Well perfused. No upper extremity edema. No lower extremity edema. FROM. No deformities. No joint erythema.  Neuro: No focal deficits appreciated. Good muscle tone. Normal response to visual stimuli. Normal response to auditory stimuli.  Skin: Normal. No rashes. No lesions noted.            Results for orders placed or " performed in visit on 12/09/24   C. trachomatis/N. gonorrhoeae by AMP DNA    Collection Time: 12/09/24  3:23 PM   Result Value Ref Range    Chlamydia, Amplified DNA Not Detected Not Detected    N gonorrhoeae, amplified DNA Not Detected Not Detected   Microalbumin/Creatinine Ratio, Urine    Collection Time: 12/09/24  3:23 PM   Result Value Ref Range    Microalbumin, Urine <5.0 ug/mL    Creatinine, Urine 92.0 23.0 - 375.0 mg/dL    Microalb/Creat Ratio Unable to calculate 0.0 - 30.0 ug/mg     Assessment & Plan    HIP BURSITIS:  - Assessed the patient's right hip pain, likely bursitis based on previous evaluation by Dr. Shaw.  - Confirmed the diagnosis of bursitis in the hip based on previous x-rays.  - Acknowledged the patient's pain and stiffness, especially after periods of inactivity.  - Explained bursitis as inflammation in the hip area.  - Discussed steroid injection procedure and its effects on inflammation.  - Administered a steroid injection (betamethasone) in the hip to manage inflammation.  - Scheduled a virtual follow-up visit in 2 weeks to assess response to steroid injection and determine if further treatment is needed.    ELEVATED BLOOD PRESSURE:  - Noted elevated blood pressure during the visit, although the patient usually runs low (120/125).  - Discussed potential causes of elevated blood pressure with the patient.  - Considered the elevated blood pressure in relation to the planned steroid injection.  - Requested a blood pressure recheck before proceeding with the steroid injection.    PREDIABETES:  - Reviewed recent lab results confirming prediabetes status.  - Checked current HbA1c level, which is 5.7, confirming prediabetes status.  - Noted that the patient was previously prescribed Metformin but is not currently taking it.    ELEVATED LIVER ENZYMES:  - Reviewed recent labs showing elevated liver enzyme.  - Recommend holding atorvastatin for 1 month.  - Ordered repeat labs to reassess liver  enzymes.    HYPERLIPIDEMIA:  - Explained the purpose of atorvastatin (Lipitor) for managing high cholesterol.  - Discontinued atorvastatin pending new lab results.  - Ordered repeat labs to reassess lipid levels.  - Scheduled follow-up after repeat labs to decide on resuming atorvastatin.    MEDICATIONS/SUPPLEMENTS:  - Noted that the patient has been taking vitamin D for 2 years.  - Restarted vitamin D supplement.  - Planned to refill vitamin D prescription.          ICD-10-CM ICD-9-CM   1. Greater trochanteric bursitis, right  M70.61 726.5   2. Essential hypertension  I10 401.9        This note was generated with the assistance of ambient listening technology. Verbal consent was obtained by the patient and accompanying visitor(s) for the recording of patient appointment to facilitate this note. I attest to having reviewed and edited the generated note for accuracy, though some syntax or spelling errors may persist. Please contact the author of this note for any clarification.        [facility]   [address] [place] [address] [address]  [place] [address]  Office [phone]  Fax [phone]

## 2025-03-06 NOTE — TELEPHONE ENCOUNTER
I have attempted without success to contact this patient by phone to I left a message on answering machine. Refill request has been sent to the provider.         ----- Message from Trinidad sent at 3/6/2025 11:56 AM CST -----  Contact: Mars  Type:  RX Refill RequestWho Called: NestorRefill or New Rx: RefillRX Name and Strength: lisinopriL-hydrochlorothiazide (PRINZIDE,ZESTORETIC) 20-12.5 mg per tabletHow is the patient currently taking it? (ex. 1XDay):1tablet 2x dayIs this a 30 day or 90 day RX: 180 tabletsPreferred Pharmacy with phone number:[address] - [address] [facility] [id number] [facility] [address]Felfc: [phone] Fax: [phone] or Mail Order:LocalOrdering Provider:SHANA DickeyWould the patient rather a call back or a response via [facility]? Call backBest Call Back Number:[phone] Information: Type:  RX Refill RequestWho Called: NestorRefill or New Rx: RefillRX Name and Strength: ergocalciferol (ERGOCALCIFEROL) 50,000 unit CapHow is the patient currently taking it? (ex. 1XDay):1 every 7 daysIs this a 30 day or 90 day RX: 12 tabletsPreferred Pharmacy with phone number:[place] [address] [place], [place] - [address] [facility] [phone] [facility] [address]Ilksj: [phone] Fax: [phone] or Mail Order:LocalOrdering Provider:SHANA DickeyWould the patient rather a call back or a response via [facility]? Call backBest Call Back Number:[phone] Information:  Type:  RX Refill RequestWho Called: NestorRefill or New Rx: RefillRX Name and Strength: omeprazole (PRILOSEC) 40 MG capsuleHow is the patient currently taking it? (ex. 1XDay):1x dayIs this a 30 day or 90 day RX: 90 tabletsPreferred Pharmacy with phone number:[facility] [address] [facility] [place] [place] [facility] [address] [facility] [phone] [facility] [address] [address]Jgbkk: [phone] Fax: [phone] or Mail Order:LocalOrdering Provider:Nancy Dickey  patient rather a call back or a response via [facility]? Call [facility] Call Back Number:[phone] Information:

## 2025-03-07 RX ORDER — OMEPRAZOLE 40 MG/1
40 CAPSULE, DELAYED RELEASE ORAL DAILY
Qty: 30 CAPSULE | Refills: 0 | Status: SHIPPED | OUTPATIENT
Start: 2025-03-07 | End: 2025-04-06

## 2025-03-07 RX ORDER — LISINOPRIL AND HYDROCHLOROTHIAZIDE 12.5; 2 MG/1; MG/1
2 TABLET ORAL DAILY
Qty: 180 TABLET | Refills: 0 | Status: SHIPPED | OUTPATIENT
Start: 2025-03-07

## 2025-03-07 RX ORDER — ERGOCALCIFEROL 1.25 MG/1
50000 CAPSULE ORAL
Qty: 4 CAPSULE | Refills: 0 | Status: SHIPPED | OUTPATIENT
Start: 2025-03-07 | End: 2025-04-04

## 2025-03-07 NOTE — TELEPHONE ENCOUNTER
----- Message from Melina sent at 3/7/2025  2:17 PM CST -----  Second RequestType:  RX Refill RequestWho Called: ptRefill or New Rx: refills (3)RX Name and Strength:lisinopriL-hydrochlorothiazide (PRINZIDE,ZESTORETIC) 20-12.5 mg per tablet, omeprazole (PRILOSEC) 40 MG capsule, and rgocalciferol (ERGOCALCIFEROL) 50,000 unit CapHow is the patient currently taking it? (ex. 1XDay):Is this a 30 day or 90 day RX: 90 daysPreferred Pharmacy with phone number:Local or Mail Order: localOrdering Provider: Eunice the patient rather a call back or a response via [facility]? phoneBe Call Back Number: [phone] Information: please notify pt, he needs this before weekend[address] [address] [facility] [phone] [facility] [address]Lslen: [phone] Fax: [phone]  ----- Message -----  From: Melina Lamar  Sent: 3/7/2025   2:22 PM CST  To: Dane Sarabia    Second RequestType:  RX Refill RequestWho Called: ptRefill or New Rx: refills (3)RX Name and Strength:lisinopriL-hydrochlorothiazide (PRINZIDE,ZESTORETIC) 20-12.5 mg per tablet, omeprazole (PRILOSEC) 40 MG capsule, and rgocalciferol (ERGOCALCIFEROL) 50,000 unit CapHow is the patient currently taking it? (ex. 1XDay):Is this a 30 day or 90 day RX: 90 daysPreferred Pharmacy with phone number:Local or Mail Order: localOrdering Provider: Eunice the patient rather a call back or a response via [employer]sSt[employer]initiative? Best Call Back Number:Additional Information: [place] [address] [facility], [place] - [address] [facility] [id number] [address]Tylll: [phone] Fax: [phone]

## 2025-05-23 ENCOUNTER — TELEPHONE (OUTPATIENT)
Dept: PRIMARY CARE CLINIC | Facility: CLINIC | Age: 65
End: 2025-05-23
Payer: MEDICAID

## 2025-05-23 NOTE — TELEPHONE ENCOUNTER
Called to inform appt is needed for referral scheduled appt to discuss labs and plan of care. He voiced understanding .        ----- Message from Georgie sent at 5/23/2025  2:42 PM CDT -----  Contact: Patient, [phone]  Patient would like to get a referral.Referral to what specialty:  UrologyDoes the patient want the referral with a specific physician:  [facility]Is the specialist an [facility] or non-[facility] physician:  OchsnerReason (be specific):  Elevated PSADoes the patient already have the specialty clinic appointment scheduled:  NoIf yes, what date is the appointment scheduled:   N/AIs the insurance listed in Epic correct? (this is important for a referral):  YesAdvised patient that once provider approves this either a nurse or  will return their call?: Would the patient like a call back, or a response through their [facility] portal?:   Call backComments:

## 2025-05-27 NOTE — TELEPHONE ENCOUNTER
----- Message from Reyna sent at 5/27/2025 11:07 AM CDT -----  Contact: [phone] (Mobile)  Requesting an RX refill or new RX.Is this a refill or new RX: refillRX name and strength (copy/paste from chart):  nystatin-triamcinolone (MYCOLOG II) cream Is this a 30 day or 90 day RX: Pharmacy name and phone # (copy/paste from chart):  [address] [address] [facility] [id number] [facility] [address]Yenbg: [phone] Fax: [phone] call pt and advise

## 2025-05-30 RX ORDER — NYSTATIN AND TRIAMCINOLONE ACETONIDE 100000; 1 [USP'U]/G; MG/G
CREAM TOPICAL 2 TIMES DAILY
Qty: 30 G | Refills: 0 | Status: SHIPPED | OUTPATIENT
Start: 2025-05-30 | End: 2025-06-13

## 2025-06-09 ENCOUNTER — OFFICE VISIT (OUTPATIENT)
Dept: PRIMARY CARE CLINIC | Facility: CLINIC | Age: 65
End: 2025-06-09
Payer: MEDICAID

## 2025-06-09 DIAGNOSIS — I10 ESSENTIAL HYPERTENSION: Chronic | ICD-10-CM

## 2025-06-09 DIAGNOSIS — R33.9 INCOMPLETE BLADDER EMPTYING: Primary | ICD-10-CM

## 2025-06-09 DIAGNOSIS — R97.20 ELEVATED PSA, LESS THAN 10 NG/ML: Chronic | ICD-10-CM

## 2025-06-09 DIAGNOSIS — R73.03 PREDIABETES: ICD-10-CM

## 2025-06-09 DIAGNOSIS — R07.81 RIB PAIN ON LEFT SIDE: ICD-10-CM

## 2025-06-09 DIAGNOSIS — R07.81 RIB PAIN ON RIGHT SIDE: ICD-10-CM

## 2025-06-09 DIAGNOSIS — E78.2 MIXED HYPERLIPIDEMIA: ICD-10-CM

## 2025-06-09 PROCEDURE — 4010F ACE/ARB THERAPY RXD/TAKEN: CPT | Mod: CPTII,95,, | Performed by: FAMILY MEDICINE

## 2025-06-09 PROCEDURE — 98006 SYNCH AUDIO-VIDEO EST MOD 30: CPT | Mod: 95,,, | Performed by: FAMILY MEDICINE

## 2025-06-09 PROCEDURE — 1160F RVW MEDS BY RX/DR IN RCRD: CPT | Mod: CPTII,95,, | Performed by: FAMILY MEDICINE

## 2025-06-09 PROCEDURE — 1159F MED LIST DOCD IN RCRD: CPT | Mod: CPTII,95,, | Performed by: FAMILY MEDICINE

## 2025-06-09 NOTE — PROGRESS NOTES
The patient location is: [place] at home  Visit type: Virtual visit with synchronous audio and video  Total time spent with patient: 30 mins  This includes face to face time and non-face to face time preparing to see the patient (eg, review of tests), obtaining and/or reviewing separately obtained history, documenting clinical information in the electronic or other health record, independently interpreting results and communicating results to the patient/family/caregiver, or care coordinator.   Each patient to whom he or she provides medical services by telemedicine is:  (1) informed of the relationship between the physician and patient and the respective role of any other health care provider with respect to management of the patient; and (2) notified that he or she may decline to receive medical services by telemedicine and may withdraw from such care at any time.  Subjective:      Chief Complaint   Patient presents with    Follow-up     Urinary symptoms and rib pain      Patient ID: Mars Unger is a 65 y.o. male.  History of Present Illness    CHIEF COMPLAINT:  Mars presents today for urinary symptoms    GENITOURINARY:  He reports frequent urination at night with thick, concentrated yellow urine. He also experiences a sensation of incomplete bladder emptying after urination. Denies hematuria and dysuria.      Urinary Symptoms: Patient reports that he has been experiencing urinary frequencies and a feeling of incomplete emptying of his bladder. 6 mons ago, patient reported that his PSA was elevated in 2022 (thinks it may have been 5) and he was supposed to have seen a urologist at the time but unable to get an appt. His PSA was rechecked and it was normal at 2.2. Today, he is requesting repeat PSA and repeat Urology referral for his new symptoms.  Advocates pain to bilateral lower ribcage.  MUSCULOSKELETAL:  He reports bilateral posterolateral rib pain present for a couple of months, worse at night.     Pain to the tips of the ribs on both sides  Pain quality: aching  Radiates to: does not radiate  abdominal pain: No  Denies Trauma. He denies associated fever, chills, or coughing.    Prediabetes:  He was previously prescribed metformin but is not currently taking it.    ROS:  General: -fever, -chills, -fatigue, -weight gain, -weight loss  Eyes: -vision changes, -redness, -discharge  ENT: -ear pain, -nasal congestion, -sore throat  Cardiovascular: -chest pain, -palpitations, -lower extremity edema  Respiratory: -cough, -shortness of breath  Gastrointestinal: -abdominal pain, -nausea, -vomiting, -diarrhea, -constipation, -blood in stool  Genitourinary: -dysuria, -hematuria, -frequency, +excessive urination, +urine changes, +abnormal urine appearance, +incomplete emptying  Musculoskeletal: -joint pain, -muscle pain  Skin: -rash, -lesion  Neurological: -headache, -dizziness, -numbness, -tingling  Psychiatric: -anxiety, -depression, -sleep difficulty       Mars Unger's allergies, medications, history, and problem list were updated as appropriate.  Past Medical History:   Diagnosis Date    Hyperlipidemia     Hypertension      No family history on file.  Social History[1]  Encounter Medications[2]       Objective:      There were no vitals taken for this visit.  Physical Exam   Physical Exam      CONSTITUTIONAL: No apparent distress. Appears comfortable. Does not appear acutely ill or septic. Appears adequately hydrated.  CARDIOVASCULAR: No perioral cyanosis  PULMONARY: Breathing unlabored. No retractions Chest expansion grossly normal.  PSYCHIATRIC: Alert and conversant and grossly oriented. Affect appropriate. Judgment and insight grossly intact.  NEUROLOGIC: No focal sensory deficits reported.        Results for orders placed or performed in visit on 01/27/25   COMPREHENSIVE METABOLIC PANEL    Collection Time: 01/27/25  4:10 PM   Result Value Ref Range    Sodium 142 136 - 145 mmol/L    Potassium 4.2 3.5 - 5.1  mmol/L    Chloride 104 95 - 110 mmol/L    CO2 28 23 - 29 mmol/L    Glucose 68 (L) 70 - 110 mg/dL    BUN 12 8 - 23 mg/dL    Creatinine 0.9 0.5 - 1.4 mg/dL    Calcium 10.0 8.7 - 10.5 mg/dL    Total Protein 8.0 6.0 - 8.4 g/dL    Albumin 4.4 3.5 - 5.2 g/dL    Total Bilirubin 0.3 0.1 - 1.0 mg/dL    Alkaline Phosphatase 91 40 - 150 U/L    AST 36 10 - 40 U/L    ALT 69 (H) 10 - 44 U/L    eGFR >60.0 >60 mL/min/1.73 m^2    Anion Gap 10 8 - 16 mmol/L     Assessment/Plan:         1. Incomplete bladder emptying    2. Elevated PSA, less than 10 ng/ml    3. Rib pain on left side    4. Rib pain on right side    5. Prediabetes    6. Mixed hyperlipidemia    7. Essential hypertension      Incomplete bladder emptying  Comments:  new sxs, likely enlarged prostate given his age, rule out UTI and if US is positive or sxs persists, start him on flomax while waiting to see Uro.  Orders:  -     US Pelvis Limited Non OB; Future; Expected date: 06/09/2025  -     Urinalysis; Future; Expected date: 12/09/2025    Elevated PSA, less than 10 ng/ml  Comments:  -Patient reassurred, will recheck PSA and have him follow up with Uro for Prostate exam.  Orders:  -     Prostate Specific Antigen, Diagnostic; Future; Expected date: 06/09/2025    Rib pain on left side  Comments:  -new, likely due to muscle strain, no red flag based on history, will screen for bone pathology given the duration. Expected to resolve w OTC analgesia  Orders:  -     X-Ray Ribs 3 Views Bilateral; Future; Expected date: 06/09/2025    Rib pain on right side  Comments:  new, likely due to muscle strain, no red flag based on history, will screen for bone pathology given the duration. Expected to resolve w OTC analgesia  Orders:  -     X-Ray Ribs 3 Views Bilateral; Future; Expected date: 06/09/2025    Prediabetes  Comments:  diet controlled, latest A1c 5.7, update lab. Ok not to take metformin. Cont diet modification and activity as tolerated.  Orders:  -     Hemoglobin A1C; Future;  Expected date: 06/09/2025    Mixed hyperlipidemia  Comments:  ascvd scoreof 13.1%, continue statin.  Orders:  -     Lipid Panel; Future; Expected date: 06/09/2025    Essential hypertension  Comments:  controlled, no change in med  Orders:  -     Comprehensive Metabolic Panel; Future; Expected date: 06/09/2025  -     Microalbumin/Creatinine Ratio, Urine; Future; Expected date: 12/09/2025    The 10-year ASCVD risk score (Sandra KENYON, et al., 2019) is: 13.1%    Values used to calculate the score:      Age: 65 years      Sex: Male      Is Non- : No      Diabetic: No      Tobacco smoker: No      Systolic Blood Pressure: 130 mmHg      Is BP treated: Yes      HDL Cholesterol: 43 mg/dL      Total Cholesterol: 160 mg/dL     Assessed urinary symptoms, including frequency and sensation of incomplete emptying.  Evaluated reported rib pain on both sides, considering potential causes such as muscle strain or pneumonia.  Reviewed prediabetes and current A1C level (5.7).  Determined prediabetes can be managed through diet control without medication at this time.    PLAN SUMMARY:   Continue current metformin prescription   Refer to urology for comprehensive evaluation of bladder issues   Order prostate labs and bladder ultrasound   Recommend OTC analgesics as needed for pain   Focus on diet modifications to manage pre-diabetic condition   Mars to schedule ultrasound appointment   Follow up sooner if pain worsens, may consider radiograph   Refer to urology for further evaluation of urinary symptoms       FEELING OF INCOMPLETE BLADDER EMPTYING:   Mars reports sensation of incomplete bladder emptying.   Ordered prostate labs and bladder ultrasound to assess emptying function.   Referred to urology for comprehensive evaluation of bladder issues.   Mars instructed to contact office to schedule ultrasound appointment.         I have reviewed all of the patient's clinical history available in care everywhere and  Epic and have utilized this in my evaluation and management recommendations today.      Treatment options and alternatives were discussed with the patient. Patient was given ample time to ask questions. All questions were answered. Voices understanding and acceptance of this advice. Will call back if any further questions or concerns.         I spent a total of 30 minutes face to face and non-face to face on the date of this visit.This includes time preparing to see the patient (eg, review of tests, notes), obtaining and/or reviewing additional history from an independent historian and/or outside medical records, documenting clinical information in the electronic health record, independently interpreting results and/or communicating results to the patient/family/caregiver, or care coordinator.  Visit today included increased complexity associated with the care of the episodic problem addressed and managing the longitudinal care of the patient due to the serious and/or complex managed problem(s).        This note was generated with the assistance of ambient listening technology. Verbal consent was obtained by the patient and accompanying visitor(s) for the recording of patient appointment to facilitate this note. I attest to having reviewed and edited the generated note for accuracy, though some syntax or spelling errors may persist. Please contact the author of this note for any clarification.            Valeria Vela MD  [facility] [place]       [1]   Social History  Socioeconomic History    Marital status:    Tobacco Use    Smoking status: Never     Passive exposure: Never    Smokeless tobacco: Never   Substance and Sexual Activity    Alcohol use: Not Currently    Drug use: Never    Sexual activity: Yes     Social Drivers of Health     Financial Resource Strain: Low Risk  (6/9/2025)    Overall Financial Resource Strain (CARDIA)     Difficulty of Paying Living Expenses: Not hard at all    Food Insecurity: No Food Insecurity (6/9/2025)    Hunger Vital Sign     Worried About Running Out of Food in the Last Year: Never true     Ran Out of Food in the Last Year: Never true   Transportation Needs: No Transportation Needs (6/9/2025)    PRAPARE - Transportation     Lack of Transportation (Medical): No     Lack of Transportation (Non-Medical): No   Physical Activity: Inactive (6/9/2025)    Exercise Vital Sign     Days of Exercise per Week: 0 days     Minutes of Exercise per Session: 10 min   Stress: No Stress Concern Present (6/9/2025)    [employer] - Occupational Stress Questionnaire     Feeling of Stress : Only a little   Housing Stability: Low Risk  (6/9/2025)    Housing Stability Vital Sign     Unable to Pay for Housing in the Last Year: No     Homeless in the Last Year: No   [2]   Outpatient Encounter Medications as of 6/9/2025   Medication Sig Dispense Refill    atorvastatin (LIPITOR) 10 MG tablet Take 10 mg by mouth every evening.      lisinopriL-hydrochlorothiazide (PRINZIDE,ZESTORETIC) 20-12.5 mg per tablet Take 2 tablets by mouth once daily. 180 tablet 0    nystatin-triamcinolone (MYCOLOG II) cream Apply topically 2 (two) times daily. for 14 days 30 g 0    omeprazole (PRILOSEC) 40 MG capsule Take 1 capsule (40 mg total) by mouth once daily. 30 capsule 0     Facility-Administered Encounter Medications as of 6/9/2025   Medication Dose Route Frequency Provider Last Rate Last Admin    lactated ringers infusion   Intravenous Continuous Coreen Alvarez MD   New Bag at 01/24/22 4089

## 2025-07-15 NOTE — TELEPHONE ENCOUNTER
No care due was identified.  [facility] Embedded Care Due Messages. Reference number: [id number].   7/15/2025 1:30:51 PM CDT

## 2025-07-18 RX ORDER — LISINOPRIL AND HYDROCHLOROTHIAZIDE 12.5; 2 MG/1; MG/1
2 TABLET ORAL DAILY
Qty: 180 TABLET | Refills: 0 | Status: SHIPPED | OUTPATIENT
Start: 2025-07-18

## 2025-08-18 ENCOUNTER — TELEPHONE (OUTPATIENT)
Dept: PRIMARY CARE CLINIC | Facility: CLINIC | Age: 65
End: 2025-08-18
Payer: MEDICAID

## 2025-08-26 ENCOUNTER — OFFICE VISIT (OUTPATIENT)
Dept: PRIMARY CARE CLINIC | Facility: CLINIC | Age: 65
End: 2025-08-26
Payer: MEDICAID

## 2025-08-26 ENCOUNTER — LAB VISIT (OUTPATIENT)
Dept: LAB | Facility: HOSPITAL | Age: 65
End: 2025-08-26
Attending: FAMILY MEDICINE
Payer: MEDICAID

## 2025-08-26 VITALS
HEART RATE: 70 BPM | TEMPERATURE: 98 F | RESPIRATION RATE: 18 BRPM | DIASTOLIC BLOOD PRESSURE: 98 MMHG | HEIGHT: 62 IN | WEIGHT: 149 LBS | OXYGEN SATURATION: 95 % | SYSTOLIC BLOOD PRESSURE: 118 MMHG | BODY MASS INDEX: 27.42 KG/M2

## 2025-08-26 DIAGNOSIS — N52.9 ERECTILE DYSFUNCTION, UNSPECIFIED ERECTILE DYSFUNCTION TYPE: Primary | ICD-10-CM

## 2025-08-26 PROCEDURE — 1160F RVW MEDS BY RX/DR IN RCRD: CPT | Mod: CPTII,,, | Performed by: NURSE PRACTITIONER

## 2025-08-26 PROCEDURE — 99214 OFFICE O/P EST MOD 30 MIN: CPT | Mod: PBBFAC,PN | Performed by: NURSE PRACTITIONER

## 2025-08-26 PROCEDURE — 99999 PR PBB SHADOW E&M-EST. PATIENT-LVL IV: CPT | Mod: PBBFAC,,, | Performed by: NURSE PRACTITIONER

## 2025-08-26 PROCEDURE — 3080F DIAST BP >= 90 MM HG: CPT | Mod: CPTII,,, | Performed by: NURSE PRACTITIONER

## 2025-08-26 PROCEDURE — 3074F SYST BP LT 130 MM HG: CPT | Mod: CPTII,,, | Performed by: NURSE PRACTITIONER

## 2025-08-26 PROCEDURE — 3008F BODY MASS INDEX DOCD: CPT | Mod: CPTII,,, | Performed by: NURSE PRACTITIONER

## 2025-08-26 PROCEDURE — 99214 OFFICE O/P EST MOD 30 MIN: CPT | Mod: S$PBB,,, | Performed by: NURSE PRACTITIONER

## 2025-08-26 PROCEDURE — 4010F ACE/ARB THERAPY RXD/TAKEN: CPT | Mod: CPTII,,, | Performed by: NURSE PRACTITIONER

## 2025-08-26 PROCEDURE — 3044F HG A1C LEVEL LT 7.0%: CPT | Mod: CPTII,,, | Performed by: NURSE PRACTITIONER

## 2025-08-26 PROCEDURE — 1159F MED LIST DOCD IN RCRD: CPT | Mod: CPTII,,, | Performed by: NURSE PRACTITIONER

## 2025-08-26 RX ORDER — TADALAFIL 20 MG/1
20 TABLET ORAL DAILY
Qty: 30 TABLET | Refills: 0 | Status: SHIPPED | OUTPATIENT
Start: 2025-08-26 | End: 2025-09-25

## 2025-08-29 ENCOUNTER — PATIENT MESSAGE (OUTPATIENT)
Dept: ADMINISTRATIVE | Facility: HOSPITAL | Age: 65
End: 2025-08-29
Payer: MEDICAID